# Patient Record
Sex: FEMALE | Race: WHITE | NOT HISPANIC OR LATINO | Employment: FULL TIME | URBAN - METROPOLITAN AREA
[De-identification: names, ages, dates, MRNs, and addresses within clinical notes are randomized per-mention and may not be internally consistent; named-entity substitution may affect disease eponyms.]

---

## 2017-11-19 ENCOUNTER — WALK IN (OUTPATIENT)
Dept: URGENT CARE | Age: 37
End: 2017-11-19

## 2017-11-19 ENCOUNTER — IMAGING SERVICES (OUTPATIENT)
Dept: GENERAL RADIOLOGY | Age: 37
End: 2017-11-19
Attending: PHYSICIAN ASSISTANT

## 2017-11-19 VITALS
WEIGHT: 155 LBS | HEART RATE: 94 BPM | BODY MASS INDEX: 25.21 KG/M2 | SYSTOLIC BLOOD PRESSURE: 148 MMHG | DIASTOLIC BLOOD PRESSURE: 80 MMHG

## 2017-11-19 DIAGNOSIS — S99.911A INJURY OF RIGHT ANKLE, INITIAL ENCOUNTER: Primary | ICD-10-CM

## 2017-11-19 DIAGNOSIS — S93.401A SPRAIN OF RIGHT ANKLE, UNSPECIFIED LIGAMENT, INITIAL ENCOUNTER: ICD-10-CM

## 2017-11-19 DIAGNOSIS — S99.911A INJURY OF RIGHT ANKLE, INITIAL ENCOUNTER: ICD-10-CM

## 2017-11-19 PROCEDURE — 73610 X-RAY EXAM OF ANKLE: CPT | Performed by: RADIOLOGY

## 2017-11-19 PROCEDURE — 99214 OFFICE O/P EST MOD 30 MIN: CPT | Performed by: PHYSICIAN ASSISTANT

## 2017-11-19 PROCEDURE — A6449 LT COMPRES BAND >=3" <5"/YD: HCPCS | Performed by: PHYSICIAN ASSISTANT

## 2017-11-19 PROCEDURE — L1902 AFO ANKLE GAUNTLET PRE OTS: HCPCS | Performed by: PHYSICIAN ASSISTANT

## 2017-11-19 ASSESSMENT — ENCOUNTER SYMPTOMS
NUMBNESS: 0
WEAKNESS: 0
COLOR CHANGE: 0

## 2017-11-21 ENCOUNTER — TELEPHONE (OUTPATIENT)
Dept: URGENT CARE | Age: 37
End: 2017-11-21

## 2019-06-25 ENCOUNTER — WALK IN (OUTPATIENT)
Dept: URGENT CARE | Age: 39
End: 2019-06-25

## 2019-06-25 VITALS
BODY MASS INDEX: 25.05 KG/M2 | SYSTOLIC BLOOD PRESSURE: 105 MMHG | TEMPERATURE: 98.3 F | HEART RATE: 67 BPM | RESPIRATION RATE: 14 BRPM | WEIGHT: 154 LBS | DIASTOLIC BLOOD PRESSURE: 64 MMHG | OXYGEN SATURATION: 100 %

## 2019-06-25 DIAGNOSIS — J45.20 MILD INTERMITTENT REACTIVE AIRWAY DISEASE WITH WHEEZING WITHOUT COMPLICATION: Primary | ICD-10-CM

## 2019-06-25 PROCEDURE — 99203 OFFICE O/P NEW LOW 30 MIN: CPT | Performed by: FAMILY MEDICINE

## 2019-06-25 RX ORDER — DEXTROMETHORPHAN HYDROBROMIDE AND PROMETHAZINE HYDROCHLORIDE 15; 6.25 MG/5ML; MG/5ML
5 SYRUP ORAL 4 TIMES DAILY PRN
Qty: 120 ML | Refills: 0 | Status: SHIPPED | OUTPATIENT
Start: 2019-06-25

## 2019-06-25 RX ORDER — DOXYCYCLINE 100 MG/1
100 CAPSULE ORAL 2 TIMES DAILY WITH MEALS
Qty: 20 CAPSULE | Refills: 0 | Status: SHIPPED | OUTPATIENT
Start: 2019-06-25

## 2019-06-25 RX ORDER — PREDNISONE 10 MG/1
10 TABLET ORAL 2 TIMES DAILY WITH MEALS
Qty: 20 TABLET | Refills: 0 | Status: SHIPPED | OUTPATIENT
Start: 2019-06-25

## 2020-01-08 ENCOUNTER — APPOINTMENT (OUTPATIENT)
Dept: FAMILY MEDICINE | Age: 40
End: 2020-01-08

## 2020-01-29 ENCOUNTER — APPOINTMENT (OUTPATIENT)
Dept: FAMILY MEDICINE | Age: 40
End: 2020-01-29

## 2020-02-13 ENCOUNTER — OFFICE VISIT (OUTPATIENT)
Dept: OCCUPATIONAL MEDICINE | Age: 40
End: 2020-02-13

## 2020-02-13 DIAGNOSIS — Z00.8 HEALTH EXAMINATION IN POPULATION SURVEY: Primary | ICD-10-CM

## 2020-02-13 PROCEDURE — OH035 DOT/N-DOT DS COLLECTION ONLY (ALL TYPES): Performed by: PREVENTIVE MEDICINE

## 2022-03-23 ENCOUNTER — APPOINTMENT (RX ONLY)
Dept: RURAL CLINIC 3 | Facility: CLINIC | Age: 42
Setting detail: DERMATOLOGY
End: 2022-03-23

## 2022-03-23 DIAGNOSIS — Z41.9 ENCOUNTER FOR PROCEDURE FOR PURPOSES OTHER THAN REMEDYING HEALTH STATE, UNSPECIFIED: ICD-10-CM

## 2022-03-23 PROCEDURE — ? PICOSURE LASER

## 2022-03-23 NOTE — PROCEDURE: PICOSURE LASER
Post-Procedure Care: Immediate endpoint: mupirocin and a bandage applied. Post care reviewed with patient.
Total Pulses (Initial Settings): 53
Spot Size: 2.0 mm
Spot Size: 4.5 mm
External Cooling Fan Speed: 0
Treatment Number: 1
Topical Anesthesia Type: 20% benzocaine, 8% lidocaine, 4% tetracaine
Detail Level: Detailed
Fluence (Include Units): 1.26 J/cm^2
Rosa (Include Units- Optional): 5.0
Location Override: left foot tattoo
Pre-Procedure: Prior to proceeding the treatment areas were cleaned and all present put on their eye protection.
Wavelength: 755 nm
Pulse Duration: 2.5 ms
Post-Care Instructions: I reviewed with the patient in detail post-care instructions. Patient should keep Vaseline on til it heals and a bandage if rubbing on shoes.
Pulse Duration: 500-750 ps
Anesthesia Type: 1% lidocaine with epinephrine
Length Of Topical Anesthesia Application (Optional): 15 minutes

## 2022-05-03 ENCOUNTER — APPOINTMENT (RX ONLY)
Dept: RURAL CLINIC 3 | Facility: CLINIC | Age: 42
Setting detail: DERMATOLOGY
End: 2022-05-03

## 2022-05-03 DIAGNOSIS — Z41.9 ENCOUNTER FOR PROCEDURE FOR PURPOSES OTHER THAN REMEDYING HEALTH STATE, UNSPECIFIED: ICD-10-CM

## 2022-05-03 PROCEDURE — ? PICOSURE LASER

## 2022-05-03 NOTE — PROCEDURE: PICOSURE LASER
Fluence (Include Units): 1.59 J/cm^2
External Cooling Fan Speed: 0
Length Of Topical Anesthesia Application (Optional): 15 minutes
Pulse Duration: 500-750 ps
Post-Care Instructions: I reviewed with the patient in detail post-care instructions. Patient should keep Vaseline on til it heals and a bandage if rubbing on shoes.
Post-Procedure Care: Immediate endpoint: mupirocin and a bandage applied. Post care reviewed with patient.
Pre-Procedure: Prior to proceeding the treatment areas were cleaned and all present put on their eye protection.
Topical Anesthesia Type: LMX Lidocaine 5%
Pulse Duration: 2.5 ms
Spot Size: 2.0 mm
Treatment Number: 2
Total Pulses (Initial Settings): 111
Spot Size: 4.0 mm
Wavelength: 755 nm
Detail Level: Detailed
Rosa (Include Units- Optional): 5.0

## 2022-06-01 ENCOUNTER — APPOINTMENT (RX ONLY)
Dept: RURAL CLINIC 3 | Facility: CLINIC | Age: 42
Setting detail: DERMATOLOGY
End: 2022-06-01

## 2022-06-01 DIAGNOSIS — Z41.9 ENCOUNTER FOR PROCEDURE FOR PURPOSES OTHER THAN REMEDYING HEALTH STATE, UNSPECIFIED: ICD-10-CM

## 2022-06-01 PROCEDURE — ? PICOSURE LASER

## 2022-06-01 NOTE — PROCEDURE: PICOSURE LASER
Fluence (Include Units): 2.08j/cm2
Spot Size: 3.5 mm
Pulse Duration: 500-750 ps
Post-Care Instructions: I reviewed with the patient in detail post-care instructions. Patient should avoid sun for a minimum of 4 weeks before and after treatment.
Spot Size: 2.0 mm
Total Pulses (Initial Settings): 104
Treatment Number: 0
Post-Procedure Care: Immediate endpoint: Erythema. Mupirocin and bandaid  applied. Post care reviewed with patient.
Treatment Number: 3
Detail Level: Detailed
Pre-Procedure: Prior to proceeding the treatment areas were cleaned and all present put on their eye protection.
Anesthesia Type: 1% lidocaine with epinephrine
Pulse Duration: 2.5 ms
Consent: Written consent obtained, risks reviewed including but not limited to crusting, scabbing, blistering, scarring, darker or lighter pigmentary change, paradoxical hair regrowth, incomplete removal of hair and infection.
Rosa (Include Units- Optional): 5.0

## 2022-07-12 ENCOUNTER — APPOINTMENT (RX ONLY)
Dept: RURAL CLINIC 2 | Facility: CLINIC | Age: 42
Setting detail: DERMATOLOGY
End: 2022-07-12

## 2022-07-12 DIAGNOSIS — Z41.9 ENCOUNTER FOR PROCEDURE FOR PURPOSES OTHER THAN REMEDYING HEALTH STATE, UNSPECIFIED: ICD-10-CM

## 2022-07-12 PROCEDURE — ? PICOSURE LASER

## 2022-07-12 PROCEDURE — ? INVENTORY

## 2022-07-12 NOTE — PROCEDURE: PICOSURE LASER
Fluence (Include Units): 2.08j/cm2
Spot Size: 3.0 mm
Pulse Duration: 500-750 ps
Post-Care Instructions: I reviewed with the patient in detail post-care instructions. Patient should avoid sun for a minimum of 4 weeks before and after treatment.
Spot Size: 2.0 mm
Total Pulses (Initial Settings): 101
Treatment Number: 0
Post-Procedure Care: Immediate endpoint: Erythema. Mupirocin and bandaid  applied. Post care reviewed with patient.
Treatment Number: 4
Detail Level: Detailed
Pre-Procedure: Prior to proceeding the treatment areas were cleaned and all present put on their eye protection.
Anesthesia Type: 1% lidocaine with epinephrine
Pulse Duration: 2.5 ms
Consent: Written consent obtained, risks reviewed including but not limited to crusting, scabbing, blistering, scarring, darker or lighter pigmentary change, paradoxical hair regrowth, incomplete removal of hair and infection.
Rosa (Include Units- Optional): 5.0
Eye Shield Text: Intraocular eye shields were applied to the patient.